# Patient Record
Sex: MALE | Race: WHITE | NOT HISPANIC OR LATINO | URBAN - METROPOLITAN AREA
[De-identification: names, ages, dates, MRNs, and addresses within clinical notes are randomized per-mention and may not be internally consistent; named-entity substitution may affect disease eponyms.]

---

## 2023-09-21 ENCOUNTER — APPOINTMENT (OUTPATIENT)
Dept: URBAN - METROPOLITAN AREA CLINIC 300 | Age: 58
Setting detail: DERMATOLOGY
End: 2023-09-22

## 2023-09-21 DIAGNOSIS — L73.8 OTHER SPECIFIED FOLLICULAR DISORDERS: ICD-10-CM

## 2023-09-21 PROCEDURE — OTHER COUNSELING: OTHER

## 2023-09-21 PROCEDURE — OTHER REASSURANCE: OTHER

## 2023-09-21 PROCEDURE — OTHER MIPS QUALITY: OTHER

## 2023-09-21 PROCEDURE — 99202 OFFICE O/P NEW SF 15 MIN: CPT

## 2023-09-21 PROCEDURE — OTHER PRESCRIPTION MEDICATION MANAGEMENT: OTHER

## 2023-09-21 PROCEDURE — OTHER PRESCRIPTION: OTHER

## 2023-09-21 RX ORDER — TACROLIMUS 1 MG/G
OINTMENT TOPICAL
Qty: 100 | Refills: 3 | Status: ERX | COMMUNITY
Start: 2023-09-21

## 2023-09-21 ASSESSMENT — LOCATION DETAILED DESCRIPTION DERM
LOCATION DETAILED: LEFT INFERIOR CENTRAL MALAR CHEEK
LOCATION DETAILED: LEFT CENTRAL MALAR CHEEK

## 2023-09-21 ASSESSMENT — LOCATION ZONE DERM: LOCATION ZONE: FACE

## 2023-09-21 ASSESSMENT — LOCATION SIMPLE DESCRIPTION DERM: LOCATION SIMPLE: LEFT CHEEK

## 2023-09-21 NOTE — PROCEDURE: PRESCRIPTION MEDICATION MANAGEMENT
Render In Strict Bullet Format?: No
Detail Level: Zone
Plan: Purchase aqua glycolic acid cleanser OTC
Initiate Treatment: tacrolimus 0.1 % topical ointment \\nQuantity: 100.0 g  Days Supply: 30\\nSig: Apply BID to areas on the left cheek\\n\\nAqua glycolic cleanser BID